# Patient Record
Sex: MALE | Race: WHITE
[De-identification: names, ages, dates, MRNs, and addresses within clinical notes are randomized per-mention and may not be internally consistent; named-entity substitution may affect disease eponyms.]

---

## 2017-11-23 ENCOUNTER — HOSPITAL ENCOUNTER (EMERGENCY)
Dept: HOSPITAL 10 - E/R | Age: 22
Discharge: HOME | End: 2017-11-23
Payer: COMMERCIAL

## 2017-11-23 VITALS
SYSTOLIC BLOOD PRESSURE: 126 MMHG | RESPIRATION RATE: 19 BRPM | HEART RATE: 81 BPM | TEMPERATURE: 98.1 F | DIASTOLIC BLOOD PRESSURE: 80 MMHG

## 2017-11-23 VITALS
WEIGHT: 162.35 LBS | HEIGHT: 60 IN | BODY MASS INDEX: 31.87 KG/M2 | WEIGHT: 162.35 LBS | HEIGHT: 60 IN | BODY MASS INDEX: 31.87 KG/M2

## 2017-11-23 DIAGNOSIS — R11.10: Primary | ICD-10-CM

## 2017-11-23 DIAGNOSIS — R10.9: ICD-10-CM

## 2017-11-23 LAB
ABNORMAL IP MESSAGE: 1
ADD UMIC: NO
ALBUMIN SERPL-MCNC: 4.3 G/DL (ref 3.3–4.9)
ALBUMIN/GLOB SERPL: 1.16 {RATIO}
ALP SERPL-CCNC: 82 IU/L (ref 42–121)
ALT SERPL-CCNC: 121 IU/L (ref 13–69)
ANION GAP SERPL CALC-SCNC: 16 MMOL/L (ref 8–16)
AST SERPL-CCNC: 52 IU/L (ref 15–46)
BASOPHILS # BLD AUTO: 0 10^3/UL (ref 0–0.1)
BASOPHILS NFR BLD: 0.5 % (ref 0–2)
BILIRUB DIRECT SERPL-MCNC: 0 MG/DL (ref 0–0.2)
BILIRUB SERPL-MCNC: 0.4 MG/DL (ref 0.2–1.3)
BUN SERPL-MCNC: 17 MG/DL (ref 7–20)
CALCIUM SERPL-MCNC: 9.2 MG/DL (ref 8.4–10.2)
CHLORIDE SERPL-SCNC: 104 MMOL/L (ref 97–110)
CO2 SERPL-SCNC: 28 MMOL/L (ref 21–31)
COLOR UR: YELLOW
CREAT SERPL-MCNC: 1.04 MG/DL (ref 0.61–1.24)
EOSINOPHIL # BLD: 0.1 10^3/UL (ref 0–0.5)
EOSINOPHIL NFR BLD: 0.9 % (ref 0–7)
ERYTHROCYTE [DISTWIDTH] IN BLOOD BY AUTOMATED COUNT: 13.8 % (ref 11.5–14.5)
GLOBULIN SER-MCNC: 3.7 G/DL (ref 1.3–3.2)
GLUCOSE SERPL-MCNC: 124 MG/DL (ref 70–220)
GLUCOSE UR STRIP-MCNC: (no result) MG/DL
HCT VFR BLD CALC: 47.9 % (ref 42–52)
HGB BLD-MCNC: 16.2 G/DL (ref 14–18)
INR PPP: 1.05
KETONES UR STRIP.AUTO-MCNC: NEGATIVE MG/DL
LYMPHOCYTES # BLD AUTO: 0.4 10^3/UL (ref 0.8–2.9)
LYMPHOCYTES NFR BLD AUTO: 4.9 % (ref 15–51)
MCH RBC QN AUTO: 30.8 PG (ref 29–33)
MCHC RBC AUTO-ENTMCNC: 33.8 G/DL (ref 32–37)
MCV RBC AUTO: 91.1 FL (ref 82–101)
MONOCYTES # BLD: 0.3 10^3/UL (ref 0.3–0.9)
MONOCYTES NFR BLD: 3.7 % (ref 0–11)
NEUTROPHILS # BLD: 7.8 10^3/UL (ref 1.6–7.5)
NEUTROPHILS NFR BLD AUTO: 89.5 % (ref 39–77)
NITRITE UR QL STRIP.AUTO: NEGATIVE MG/DL
NRBC # BLD MANUAL: 0 10^3/UL (ref 0–0)
NRBC BLD AUTO-RTO: 0 /100WBC (ref 0–0)
PLATELET # BLD: 239 10^3/UL (ref 140–415)
PMV BLD AUTO: 10.2 FL (ref 7.4–10.4)
POSITIVE DIFF: (no result)
POTASSIUM SERPL-SCNC: 4 MMOL/L (ref 3.5–5.1)
PROT SERPL-MCNC: 8 G/DL (ref 6.1–8.1)
PROTHROMBIN TIME: 13.7 SEC (ref 12.2–14.2)
PT RATIO: 1.1
RBC # BLD AUTO: 5.26 10^6/UL (ref 4.7–6.1)
RBC # UR AUTO: NEGATIVE MG/DL
SODIUM SERPL-SCNC: 144 MMOL/L (ref 135–144)
UR ASCORBIC ACID: NEGATIVE MG/DL
UR BILIRUBIN (DIP): NEGATIVE MG/DL
UR CLARITY: CLEAR
UR PH (DIP): 6 (ref 5–9)
UR SPECIFIC GRAVITY (DIP): 1.03 (ref 1–1.03)
UR TOTAL PROTEIN (DIP): NEGATIVE MG/DL
UROBILINOGEN UR STRIP-ACNC: (no result) MG/DL
WBC # BLD AUTO: 8.7 10^3/UL (ref 4.8–10.8)
WBC # UR STRIP: NEGATIVE LEU/UL

## 2017-11-23 PROCEDURE — 83690 ASSAY OF LIPASE: CPT

## 2017-11-23 PROCEDURE — 74177 CT ABD & PELVIS W/CONTRAST: CPT

## 2017-11-23 PROCEDURE — 81003 URINALYSIS AUTO W/O SCOPE: CPT

## 2017-11-23 PROCEDURE — 85610 PROTHROMBIN TIME: CPT

## 2017-11-23 PROCEDURE — 96376 TX/PRO/DX INJ SAME DRUG ADON: CPT

## 2017-11-23 PROCEDURE — 80053 COMPREHEN METABOLIC PANEL: CPT

## 2017-11-23 PROCEDURE — 85025 COMPLETE CBC W/AUTO DIFF WBC: CPT

## 2017-11-23 PROCEDURE — 36415 COLL VENOUS BLD VENIPUNCTURE: CPT

## 2017-11-23 PROCEDURE — 96374 THER/PROPH/DIAG INJ IV PUSH: CPT

## 2017-11-23 NOTE — ERD
ER Documentation


Chief Complaint


Chief Complaint


BIBA RA89,from home,vomiting,hx Down syndrome





HPI


Patient is a 22-year-old male with Down syndrome and fatty liver who presents 

to the ER with several episodes of nonbloody, nonbilious emesis since early 

this afternoon.  He has had approximately 5 episodes.  He has not had a bowel 

movement today.  He has bowel movements approximately once every 2 days.  He 

has not had fever.  He is nonverbal at baseline, which limits history, but his 

mother states that she thinks she may be having abdominal pain.





ROS


All systems reviewed and are negative except as per history of present illness.





Medications


Home Meds


Active Scripts


Ondansetron (Ondansetron Odt) 4 Mg Tab.rapdis, 4 MG PO Q8H Y for NAUSEA AND/OR 

VOMITING, #12 TAB


   Prov:DANIEL KELLEY MD         11/23/17


Discontinued Scripts


Ondansetron Hcl* (Zofran* ODT) 4 mg -ODT Tab.disper, 4 MG PO Q8 Y for NAUSEA AND

/OR VOMITING, #30 TAB


   Prov:HUMBLE ROCHE NP         7/6/15


Ciprofloxacin Hcl* (Ciprofloxacin Hcl*) 500 Mg Tablet, 500 MG PO BID for 7 Days

, TAB


   Prov:HUMBLE ROCHE NP         7/6/15


Ibuprofen* (Ibuprofen*) 600 Mg Tablet, 600 MG PO Q6, #20 TAB


   Prov:ALEE DURAN PA-C         6/30/15


Cephalexin* (Cephalexin*) 500 Mg Capsule, 500 MG PO QID for 7 Days, CAP


   Prov:ALEE DURAN PA-C         6/30/15


Bacitracin* (Bacitracin Oint (UD)*) 1 Applic Oint, 1 APPLIC TOP ONCE, #1 PKT


   APPLY TO


   Prov:ALEE DURAN PA-C         6/30/15





Allergies


Allergies:  


Coded Allergies:  


     No Known Allergy (Unverified , 10/9/12)





PMhx/Soc


Past medical history: Down syndrome, fatty liver


Surgical history: None


Social history: Lives with mom, no alcohol or drugs


History of Surgery:  Yes (tonsillectomy)


Anesthesia Reaction:  No


Hx Neurological Disorder:  Yes (down's syndrome)


Hx Respiratory Disorders:  No


Hx Cardiac Disorders:  No


Hx Psychiatric Problems:  No


Hx Miscellaneous Medical Probl:  No


Hx Alcohol Use:  No


Hx Substance Use:  No


Hx Tobacco Use:  No


Smoking Status:  Never smoker





FmHx


Family History:  No coronary disease, No diabetes





Physical Exam


Vitals





Vital Signs








  Date Time  Temp Pulse Resp B/P Pulse Ox O2 Delivery O2 Flow Rate FiO2


 


11/23/17 00:51 98.1 81 19 126/80 100 Nasal Cannula  


 


11/23/17 00:06 97.9 109 18 108/59 96   








Physical Exam


Const:     Alert, no acute distress


Head:   Atraumatic 


Eyes:    Normal Conjunctiva, No pallor, no icterus


ENT:    Normal External Ears, Nose and Mouth.  Slightly dry mucous membranes


Neck:               Full range of motion.  No meningismus.


Resp:    Clear to auscultation bilaterally, No wheezes, no rales


Cardio:    Regular rate and rhythm, no murmurs


Abd:    Slightly firm, distended. No obvious focal tenderness


Skin:    No petechiae or rashes


Back:    No midline or flank tenderness


Ext:    No cyanosis, or edema


Neur:    Awake and alert, Cranial nerves II through XII intact bilaterally, 

Moves 4 extremities


Psych:    Normal Mood and Affect


Result Diagram:  


11/23/17 0030                                                                  

              11/23/17 0030





Results 24 hrs





 Laboratory Tests








Test


  11/23/17


00:30 11/23/17


03:29


 


White Blood Count 8.710^3/ul  


 


Red Blood Count 5.2610^6/ul  


 


Hemoglobin 16.2g/dl  


 


Hematocrit 47.9%  


 


Mean Corpuscular Volume 91.1fl  


 


Mean Corpuscular Hemoglobin 30.8pg  


 


Mean Corpuscular Hemoglobin


Concent 33.8g/dl 


  


 


 


Red Cell Distribution Width 13.8%  


 


Platelet Count 36994^3/UL  


 


Mean Platelet Volume 10.2fl  


 


Neutrophils % 89.5%  


 


Lymphocytes % 4.9%  


 


Monocytes % 3.7%  


 


Eosinophils % 0.9%  


 


Basophils % 0.5%  


 


Nucleated Red Blood Cells % 0.0/100WBC  


 


Neutrophils # 7.810^3/ul  


 


Lymphocytes # 0.410^3/ul  


 


Monocytes # 0.310^3/ul  


 


Eosinophils # 0.110^3/ul  


 


Basophils # 0.010^3/ul  


 


Nucleated Red Blood Cells # 0.010^3/ul  


 


Prothrombin Time 13.7Sec  


 


Prothrombin Time Ratio 1.1  


 


INR International Normalized


Ratio 1.05 


  


 


 


Sodium Level 144mmol/L  


 


Potassium Level 4.0mmol/L  


 


Chloride Level 104mmol/L  


 


Carbon Dioxide Level 28mmol/L  


 


Anion Gap 16  


 


Blood Urea Nitrogen 17mg/dl  


 


Creatinine 1.04mg/dl  


 


Glucose Level 124mg/dl  


 


Calcium Level 9.2mg/dl  


 


Total Bilirubin 0.4mg/dl  


 


Direct Bilirubin 0.00mg/dl  


 


Indirect Bilirubin 0.4mg/dl  


 


Aspartate Amino Transf


(AST/SGOT) 52IU/L 


  


 


 


Alanine Aminotransferase


(ALT/SGPT) 121IU/L 


  


 


 


Alkaline Phosphatase 82IU/L  


 


Total Protein 8.0g/dl  


 


Albumin 4.3g/dl  


 


Globulin 3.70g/dl  


 


Albumin/Globulin Ratio 1.16  


 


Lipase 131U/L  


 


Urine Color  YELLOW 


 


Urine Clarity  CLEAR 


 


Urine pH  6.0 


 


Urine Specific Gravity  1.030 


 


Urine Ketones  NEGATIVEmg/dL 


 


Urine Nitrite  NEGATIVEmg/dL 


 


Urine Bilirubin  NEGATIVEmg/dL 


 


Urine Urobilinogen  1+mg/dL 


 


Urine Leukocyte Esterase  NEGATIVELeu/ul 


 


Urine Hemoglobin  NEGATIVEmg/dL 


 


Urine Glucose  3+mg/dL 


 


Urine Total Protein  NEGATIVEmg/dl 








 Current Medications








 Medications


  (Trade)  Dose


 Ordered  Sig/Dimitri


 Route


 PRN Reason  Start Time


 Stop Time Status Last Admin


Dose Admin


 


 Ondansetron HCl 4


 mg  4 mg  ONCE  ONCE


 IV


   11/23/17 00:26


 11/23/17 00:29 DC 11/23/17 01:04


 


 


 Sodium Chloride


  (NS)  1,000 ml @ 


 1,000 mls/hr  Q1H ONCE


 IV


   11/23/17 00:30


 11/23/17 01:29 DC 11/23/17 01:04


 


 


 Ondansetron HCl 4


 mg  4 mg  STK-MED ONCE


 .ROUTE


   11/23/17 00:27


 11/23/17 00:28 DC  


 


 


 Sodium Chloride


  (NS)  500 ml @ 


 500 mls/hr  Q1H STAT


 IV


   11/23/17 01:13


 11/23/17 02:12 DC 11/23/17 01:13


 


 


 Ondansetron HCl


  (Zofran Inj)  4 mg  ONCE  ONCE


 IV


   11/23/17 02:26


 11/23/17 02:27 DC 11/23/17 02:27


 


 


 IV Flush 10 ml  10 ml  STK-MED ONCE


 .ROUTE


   11/23/17 02:50


 11/23/17 02:51 DC 11/23/17 03:04


 


 


 Sodium Chloride


  (NS)  100 ml @ ud  STK-MED ONCE


 .ROUTE


   11/23/17 02:50


 11/23/17 02:51 DC 11/23/17 03:04


 


 


 Iohexol


  (Omnipaque 300mg/


 ml)  150 ml  STK-MED ONCE


 .ROUTE


   11/23/17 02:50


 11/23/17 02:51 DC 11/23/17 03:04


 











Procedures/MDM


MDM: Patient is a 22-year-old male with Down syndrome who presents with several 

episodes of vomiting.  He initially had a somewhat firm abdomen, so a CT scan 

was performed and shows no acute abnormality.  The CT scan does show a hiatal 

hernia with gas in the distal esophagus.  There is no evidence of esophageal 

rupture.  There was comment of possible infiltrate in the lung, but the patient 

has no respiratory symptoms.  Labs were otherwise unremarkable.  After period 

of observation, the patient was able to tolerate oral intake and had a benign 

repeat abdominal exam which was soft and nontender.  He will be discharged home 

with a prescription for Zofran and return precautions.  Differential diagnosis 

includes early gastroenteritis.





Departure


Diagnosis:  


 Primary Impression:  


 Vomiting


 Vomiting type:  unspecified  Vomiting Intractability:  non-intractable  Nausea 

presence:  unspecified  Qualified Code:  R11.10 - Non-intractable vomiting, 

presence of nausea not specified, unspecified vomiting type


Condition:  DANIEL Sky MD Nov 23, 2017 01:18

## 2017-11-23 NOTE — RADRPT
PROCEDURE:   CT Abdomen and pelvis with contrast

 

CLINICAL INDICATION:   Abdominal pain

 

TECHNIQUE:   Spiral CT images through the abdomen and pelvis without administration of oral and duri
ng administration of 90 cc of Omnipaque-300 contrast material.  Multiplanar reconstructions.  The to
paulette exam CTDI equals 13.72 mGy and the total exam DLP equals 84.64 mGy-cm. One or more of the follow
ing dose reduction techniques were used: automated exposure control, adjustment of the mA and/or kV 
according to patient size, or use of iterative reconstruction technique. DICOM images are available.

 

COMPARISON:   07/06/2015 

 

FINDINGS:

 

Lower thorax: Slight dependent atalectasis of the lung bases is seen.. There is also slight patchy a
lveolar opacity in the left upper lobe which could be due to an area of infectious infiltrate. Small
 hiatal hernia is again seen. The distal esophagus is somewhat distended with air.

 

Liver: Diffuse hepatic steatosis is again seen with areas of focal fatty sparing adjacent to the gal
lbladder..    The liver is enlarged, measuring 21.7 cm in length. 

 

Biliary:  The gallbladder is unremarkable.. No biliary ductal dilatation is seen. 

 

Spleen: The spleen is unremarkable in appearance 

 

Adrenal glands: Unremarkable in appearance.  No focal nodule..   

 

Genitourinary: No hydronephrosis or renal calculi.. The bladder is mildly distended but no wall thic
kening is seen. There is no evidence for urolithiasis...   

 

Pancreas: Unremarkable. No focal mass or inflammatory process. 

 

Gastrointestinal Tract: Evaluation of the bowel is limited by breathing motion artifact and lack of 
enteric contrast. There is no definite evidence for bowel obstruction, free air, or abscess. Moderat
e stool burden..  The appendix is unremarkable in appearance.     

 

Lymph nodes: No adenopathy is seen..  

 

Peritoneal cavity: Unremarkable mesentery and peritoneum.. No mass, edema, or ascites.  

 

Reproductive Organs: Unremarkable in appearance..   

 

Vascular structures: The aorta and mesenteric vessels are unremarkable..  

 

Musculoskeletal: No bony abnormality is seen.. 

 

 

IMPRESSION:

Hepatomegaly and diffuse hepatic steatosis..  

 

Patchy alveolar opacity of the left upper lobe which could be due to infectious infiltrate. Correlat
e clinically.  

 

Small hiatal hernia with air distending the distal esophagus.

 

RPTAT: HLBE

_____________________________________________ 

Kristen Samson, Physician           Date    Time 

Electronically viewed and signed by Kristen Samson, Physician on 11/23/2017 03:17 

 

D:  11/23/2017 03:17  T:  11/23/2017 03:17

LE/

## 2018-02-19 ENCOUNTER — HOSPITAL ENCOUNTER (EMERGENCY)
Age: 23
Discharge: HOME | End: 2018-02-19

## 2018-02-19 ENCOUNTER — HOSPITAL ENCOUNTER (EMERGENCY)
Dept: HOSPITAL 91 - E/R | Age: 23
Discharge: HOME | End: 2018-02-19
Payer: COMMERCIAL

## 2018-02-19 DIAGNOSIS — J20.9: Primary | ICD-10-CM

## 2018-02-19 PROCEDURE — 99284 EMERGENCY DEPT VISIT MOD MDM: CPT

## 2018-06-08 ENCOUNTER — HOSPITAL ENCOUNTER (EMERGENCY)
Dept: HOSPITAL 91 - E/R | Age: 23
Discharge: HOME | End: 2018-06-08
Payer: COMMERCIAL

## 2018-06-08 DIAGNOSIS — R40.2232: ICD-10-CM

## 2018-06-08 DIAGNOSIS — A08.4: ICD-10-CM

## 2018-06-08 DIAGNOSIS — R40.2142: ICD-10-CM

## 2018-06-08 DIAGNOSIS — Q90.9: Primary | ICD-10-CM

## 2018-06-08 DIAGNOSIS — R40.2362: ICD-10-CM

## 2018-06-08 DIAGNOSIS — J45.909: ICD-10-CM

## 2018-06-08 LAB
ABNORMAL IP MESSAGE: 1
ADD MAN DIFF?: NO
ALANINE AMINOTRANSFERASE: 63 IU/L (ref 13–69)
ALBUMIN/GLOBULIN RATIO: 1.18
ALBUMIN: 4.4 G/DL (ref 3.3–4.9)
ALKALINE PHOSPHATASE: 84 IU/L (ref 42–121)
ANION GAP: 17 (ref 8–16)
ASPARTATE AMINO TRANSFERASE: 32 IU/L (ref 15–46)
BASOPHIL #: 0.1 10^3/UL (ref 0–0.1)
BASOPHILS %: 0.6 % (ref 0–2)
BILIRUBIN,DIRECT: 0 MG/DL (ref 0–0.2)
BILIRUBIN,TOTAL: 0.3 MG/DL (ref 0.2–1.3)
BLOOD UREA NITROGEN: 17 MG/DL (ref 7–20)
CALCIUM: 9.2 MG/DL (ref 8.4–10.2)
CARBON DIOXIDE: 28 MMOL/L (ref 21–31)
CHLORIDE: 108 MMOL/L (ref 97–110)
CREATININE: 0.92 MG/DL (ref 0.61–1.24)
EOSINOPHILS #: 0 10^3/UL (ref 0–0.5)
EOSINOPHILS %: 0.4 % (ref 0–7)
GLOBULIN: 3.7 G/DL (ref 1.3–3.2)
GLUCOSE: 132 MG/DL (ref 70–220)
HEMATOCRIT: 51.3 % (ref 42–52)
HEMOGLOBIN: 17.3 G/DL (ref 14–18)
LIPASE: 147 U/L (ref 23–300)
LYMPHOCYTES #: 0.4 10^3/UL (ref 0.8–2.9)
LYMPHOCYTES %: 3.9 % (ref 15–51)
MEAN CORPUSCULAR HEMOGLOBIN: 30.5 PG (ref 29–33)
MEAN CORPUSCULAR HGB CONC: 33.7 G/DL (ref 32–37)
MEAN CORPUSCULAR VOLUME: 90.3 FL (ref 82–101)
MEAN PLATELET VOLUME: 9.7 FL (ref 7.4–10.4)
MONOCYTE #: 0.5 10^3/UL (ref 0.3–0.9)
MONOCYTES %: 4.6 % (ref 0–11)
NEUTROPHIL #: 9.5 10^3/UL (ref 1.6–7.5)
NEUTROPHILS %: 90 % (ref 39–77)
NUCLEATED RED BLOOD CELLS #: 0 10^3/UL (ref 0–0)
NUCLEATED RED BLOOD CELLS%: 0 /100WBC (ref 0–0)
PLATELET COUNT: 243 10^3/UL (ref 140–415)
POSITIVE DIFF: (no result)
POTASSIUM: 4.3 MMOL/L (ref 3.5–5.1)
RED BLOOD COUNT: 5.68 10^6/UL (ref 4.7–6.1)
RED CELL DISTRIBUTION WIDTH: 13.8 % (ref 11.5–14.5)
SODIUM: 149 MMOL/L (ref 135–144)
TOTAL PROTEIN: 8.1 G/DL (ref 6.1–8.1)
WHITE BLOOD COUNT: 10.5 10^3/UL (ref 4.8–10.8)

## 2018-06-08 PROCEDURE — 85025 COMPLETE CBC W/AUTO DIFF WBC: CPT

## 2018-06-08 PROCEDURE — 74176 CT ABD & PELVIS W/O CONTRAST: CPT

## 2018-06-08 PROCEDURE — 80053 COMPREHEN METABOLIC PANEL: CPT

## 2018-06-08 PROCEDURE — 83690 ASSAY OF LIPASE: CPT

## 2018-06-08 PROCEDURE — 99284 EMERGENCY DEPT VISIT MOD MDM: CPT

## 2018-06-08 RX ADMIN — ALUMINUM HYDROXIDE, MAGNESIUM HYDROXIDE, DIMETHICONE 1 ML: 200; 200; 20 SUSPENSION ORAL at 07:56

## 2018-06-08 RX ADMIN — PHENOBARBITAL, HYOSCYAMINE SULFATE, ATROPINE SULFATE, SCOPOLAMINE HYDROBROMIDE 1 TAB: 16.2; .1037; .0194; .0065 TABLET ORAL at 07:56

## 2018-06-08 RX ADMIN — FAMOTIDINE 1 MG: 20 TABLET ORAL at 07:56

## 2018-06-08 RX ADMIN — ONDANSETRON 1 MG: 4 TABLET, ORALLY DISINTEGRATING ORAL at 07:56

## 2019-03-22 ENCOUNTER — HOSPITAL ENCOUNTER (EMERGENCY)
Dept: HOSPITAL 91 - FTE | Age: 24
Discharge: HOME | End: 2019-03-22
Payer: COMMERCIAL

## 2019-03-22 ENCOUNTER — HOSPITAL ENCOUNTER (EMERGENCY)
Dept: HOSPITAL 10 - FTE | Age: 24
Discharge: HOME | End: 2019-03-22
Payer: COMMERCIAL

## 2019-03-22 VITALS — HEART RATE: 62 BPM | SYSTOLIC BLOOD PRESSURE: 134 MMHG | DIASTOLIC BLOOD PRESSURE: 71 MMHG | RESPIRATION RATE: 20 BRPM

## 2019-03-22 VITALS
HEIGHT: 60 IN | WEIGHT: 169.09 LBS | BODY MASS INDEX: 33.2 KG/M2 | HEIGHT: 60 IN | WEIGHT: 169.09 LBS | BODY MASS INDEX: 33.2 KG/M2

## 2019-03-22 DIAGNOSIS — R05: Primary | ICD-10-CM

## 2019-03-22 PROCEDURE — 99283 EMERGENCY DEPT VISIT LOW MDM: CPT

## 2019-03-22 NOTE — ERD
ER Documentation


Chief Complaint


Chief Complaint





PRODUCTIVE COUGH X2WEEKS





HPI


23-year-old male presents with productive cough for last 2 weeks.  He is here 


with his mother with similar symptoms.  He has a history of Down syndrome.  


Denies asthma, diabetes, no history of chest pain, abdominal pain, vomiting, 


additional symptoms.





ROS


All systems reviewed and are negative except as per history of present illness.





Medications


Home Meds


Active Scripts


Dextromethorphan Hb-Promethazine Hcl* (Promethazine DM* Syrup) 473 Ml Syrup, 5 


ML PO Q6 PRN for COUGH for 5 Days, ML


   Prov:CHINEDU OLMEDO MD         3/22/19


Azithromycin* (Zithromax*) 250 Mg Tablet, 250 MG PO .ZPACK AS DIRECTED, #6 TAB


   TAKE 500 MG (2 TABS) THE FIRST DAY THEN 250 MG (1 TAB) DAYS 2-5


   Prov:CHINEDU OLMEDO MD         3/22/19


Ondansetron Hcl* (Zofran*) 4 Mg Tab, 4 MG PO Q6H PRN for NAUSEA AND OR VOMITING,


#15 TAB


   Prov:KEVIN FLETCHER MD         6/8/18


Famotidine* (Famotidine*) 20 Mg Tablet, 20 MG PO DAILY, #30 TAB


   Prov:KEVIN FLETCHER MD         6/8/18


Mag Hydrox/Al Hydrox/Simeth (Maalox Advanced Suspension) 355 Ml Oral.susp, 2 TSP


PO TID for PAIN, #24 OZ


   Prov:KEVIN FLETCHER MD         6/8/18


Reported Medications


Diphenhydramine Hcl (Banophen) 25 Mg Capsule, 25 MG PO QHS PRN for SLEEP, CAP


   6/8/18





Allergies


Allergies:  


Coded Allergies:  


     No Known Allergy (Unverified , 6/8/18)





PMhx/Soc


History of Surgery:  Yes (tonsillectomy)


Anesthesia Reaction:  No


Hx Neurological Disorder:  Yes (down's syndrome)


Hx Respiratory Disorders:  No


Hx Cardiac Disorders:  No


Hx Psychiatric Problems:  No


Hx Miscellaneous Medical Probl:  No


Hx Alcohol Use:  No


Hx Substance Use:  No


Hx Tobacco Use:  No





FmHx


Family History:  No diabetes, No coronary disease, No other





Physical Exam


Vitals





Vital Signs


  Date      Temp  Pulse  Resp  B/P (MAP)   Pulse Ox  O2          O2 Flow    FiO2


Time                                                 Delivery    Rate


   3/22/19  97.2     62    20      134/71        96


     12:49                           (92)





Physical Exam


Const:   No acute distress


Head:   Atraumatic 


Eyes:    Normal Conjunctiva


ENT:    Normal External Ears, Nose and Mouth.  TMs and oropharynx normal.


Neck:               Full range of motion. No meningismus.


Resp:   Clear to auscultation bilaterally with coarse cough without rales, 


wheezing or retractions.


Cardio:   Regular rate and rhythm, no murmurs


Abd:    Soft, non tender, non distended. Normal bowel sounds


Skin:   No petechiae or rashes


Back:   No midline or flank tenderness


Ext:    No cyanosis, or edema


Neur:   Awake and alert


Psych:    Normal Mood and Affect





Procedures/MDM


She presents worsening her symptoms of productive cough for last 2 weeks.  He 


has no signs of hypoxemia, rest distress, signs of pneumonia.  We will treat 


empirically with promethazine, Zithromax, primary care follow-up and return 


precautions.  The patient was stable with no new complaints during the ER 


course. Clinically, there is no current evidence to suggest meningitis, sepsis, 


acute abdomen, pneumonia, stroke,  acute coronary syndrome, pulmonary embolism, 


aortic dissection or any other emergent condition appearing to require further 


evaluation or hospitalization.  Patient counseled regarding my diagnostic 


impression and care plan. Prior to discharge all questions answered. Pt agrees 


with treatment plan and understands strict return precautions. Pt is instructed 


to follow up with primary care provider within 24-48 hours. Precautionary 


instructions provided including instructions to return to the ER if not 


improving or for any worsening or changing symptoms or concerns.





Departure


Diagnosis:  


   Primary Impression:  


   Cough


Condition:  Stable


Patient Instructions:  Bronchitis, Antiobiotic Treatment (Adult)





Additional Instructions:  


Recheck for new or worsening symptoms with primary care doctor.











CHINEDU OLMEDO MD             Mar 22, 2019 14:57